# Patient Record
Sex: MALE | NOT HISPANIC OR LATINO | Employment: FULL TIME | URBAN - METROPOLITAN AREA
[De-identification: names, ages, dates, MRNs, and addresses within clinical notes are randomized per-mention and may not be internally consistent; named-entity substitution may affect disease eponyms.]

---

## 2023-09-14 PROBLEM — J45.20 MILD INTERMITTENT ASTHMA (HHS-HCC): Status: ACTIVE | Noted: 2021-12-06

## 2023-09-14 PROBLEM — F41.9 ANXIETY: Status: ACTIVE | Noted: 2022-12-19

## 2023-09-14 PROBLEM — M77.8 ELBOW TENDINITIS: Status: ACTIVE | Noted: 2023-09-14

## 2023-09-14 PROBLEM — J32.9 CHRONIC SINUSITIS: Status: ACTIVE | Noted: 2023-09-14

## 2023-09-14 PROBLEM — M54.12 CERVICAL RADICULOPATHY: Status: ACTIVE | Noted: 2023-09-14

## 2023-09-14 PROBLEM — G43.909 MIGRAINE HEADACHE: Status: ACTIVE | Noted: 2023-09-14

## 2023-09-14 PROBLEM — J30.9 ALLERGIC RHINITIS: Status: ACTIVE | Noted: 2023-09-14

## 2023-09-14 PROBLEM — E78.5 HYPERLIPIDEMIA: Status: ACTIVE | Noted: 2023-09-14

## 2023-09-14 PROBLEM — H44.23 MYOPIC DEGENERATION, BILATERAL: Status: ACTIVE | Noted: 2023-09-14

## 2023-09-14 PROBLEM — G25.81 RLS (RESTLESS LEGS SYNDROME): Status: ACTIVE | Noted: 2023-09-14

## 2023-09-14 PROBLEM — G47.33 OBSTRUCTIVE SLEEP APNEA: Status: ACTIVE | Noted: 2023-09-14

## 2023-09-14 PROBLEM — D70.9 NEUTROPENIA (CMS-HCC): Status: ACTIVE | Noted: 2022-01-11

## 2023-09-14 PROBLEM — Z86.69 HISTORY OF RETINAL DETACHMENT: Status: ACTIVE | Noted: 2023-09-14

## 2023-09-14 RX ORDER — ASCORBIC ACID 500 MG
1000 TABLET ORAL DAILY
COMMUNITY
Start: 2022-12-19

## 2023-09-14 RX ORDER — MONTELUKAST SODIUM 10 MG/1
10 TABLET ORAL
COMMUNITY
Start: 2020-12-28 | End: 2023-12-21 | Stop reason: ALTCHOICE

## 2023-09-14 RX ORDER — CETIRIZINE HYDROCHLORIDE, PSEUDOEPHEDRINE HYDROCHLORIDE 5; 120 MG/1; MG/1
1 TABLET, FILM COATED, EXTENDED RELEASE ORAL 2 TIMES DAILY PRN
COMMUNITY
Start: 2018-03-21 | End: 2023-12-21 | Stop reason: ALTCHOICE

## 2023-09-14 RX ORDER — ELETRIPTAN HYDROBROMIDE 40 MG/1
40 TABLET, FILM COATED ORAL ONCE AS NEEDED
COMMUNITY
Start: 2019-07-23

## 2023-09-14 RX ORDER — ACETYLCYSTEINE 600 MG
600 CAPSULE ORAL DAILY
COMMUNITY
Start: 2021-12-06 | End: 2024-04-05 | Stop reason: ALTCHOICE

## 2023-09-14 RX ORDER — MILK THISTLE 150 MG
1 CAPSULE ORAL
COMMUNITY
Start: 2021-12-06

## 2023-09-14 RX ORDER — CETIRIZINE HYDROCHLORIDE 10 MG/1
10 TABLET ORAL
COMMUNITY

## 2023-09-14 RX ORDER — LEVOFLOXACIN 500 MG/1
1 TABLET, FILM COATED ORAL DAILY
COMMUNITY
Start: 2020-03-12 | End: 2023-12-21 | Stop reason: ALTCHOICE

## 2023-09-14 RX ORDER — ZAFIRLUKAST 20 MG/1
20 TABLET, FILM COATED ORAL
COMMUNITY
Start: 2021-12-06

## 2023-09-14 RX ORDER — AZELASTINE HYDROCHLORIDE, FLUTICASONE PROPIONATE 137; 50 UG/1; UG/1
SPRAY, METERED NASAL
COMMUNITY
Start: 2021-12-06 | End: 2023-12-21 | Stop reason: ALTCHOICE

## 2023-09-14 RX ORDER — ALBUTEROL SULFATE 90 UG/1
2 AEROSOL, METERED RESPIRATORY (INHALATION) EVERY 4 HOURS PRN
COMMUNITY
Start: 2014-07-09

## 2023-10-17 ENCOUNTER — OFFICE VISIT (OUTPATIENT)
Dept: SLEEP MEDICINE | Facility: HOSPITAL | Age: 53
End: 2023-10-17
Payer: COMMERCIAL

## 2023-10-17 DIAGNOSIS — G47.33 OBSTRUCTIVE SLEEP APNEA: Primary | ICD-10-CM

## 2023-10-17 PROCEDURE — 99215 OFFICE O/P EST HI 40 MIN: CPT | Performed by: INTERNAL MEDICINE

## 2023-10-17 PROCEDURE — 99215 OFFICE O/P EST HI 40 MIN: CPT | Mod: 95 | Performed by: INTERNAL MEDICINE

## 2023-10-17 NOTE — PROGRESS NOTES
[facility]  Follow-up Visit Note    Virtual or Telephone Consent  An interactive audio and video telecommunication system which permits real time communications between the patient (at the originating site) and provider (at the distant site) was utilized to provide this telehealth service.   Verbal consent was requested and obtained from Holger Mckeon on this date, 10/17/23 for a telehealth visit.     CHIEF COMPLAINT     Chief Complaint   Patient presents with    Follow-up        HISTORY OF PRESENT ILLNESS     The patient's referring provider is: No ref. provider found    HISTORY OF PRESENT ILLNESS   Holger Mckeon is a 53 y.o. male with PMHx of HLD, migraine, chronic sinusitis who presents for follow-up with:      # OZZY  - 1/25/2022: SM (Last OV)--Plan: start AirSense-11 at 10-15 cm H2O. Advised positional therapy, avoid supine sleep.  - tried Re mask, but switched to FP due to recall on Re--states the Re created leak when he slept on sides. Now using Rachelle View   - reports waking up due to mask shifting, but is able to fall back to sleep easily      Sleep Schedule:    Weekdays / Work Days Weekends / Days Off   Bedtime 10:30 pm [x] Same   Sleep latency <5 minutes    Wake time 7:00 am    Naps? Yes - taking a nap around 8pm for 1-1.5 hours, 3x/week  Naps are not refreshing.       Nocturnal awakenings 1-2x/night  Last <5 minutes  Adjust mask/hose, reposition       PAP Adherence:  DURABLE MEDICAL EQUIPMENT COMPANY: MEDICAL SERVICE COMPANY  Machine: RESMED AIRSENSE 11   ISSUES WITH THERAPY: does report somewhat of a leak  PERCEIVED BENEFITS OF PAP: sleeping for a longer duration of time    Download review date: 10/17/23  PAP Setting  Machine : [employer]  Date Range: 9/16/23 - 10/15/23  PAP Mode: APAP  EPAPmin (cmH20): 10  EPAPmax (cmH20): 15  Modified pressure profile :  (EPR 3) PAP Useage  % days >= 4 hours/night: 87  PAP Pressure/AHI/Leak  Mean EPAP: 10.1  P90/P95th  EPAP: 10.8  Mean leak (lpm): 20.2  90th/95th percentile leak (lpm): 43.7  Residual AHI: 0.9  Central AHI: 0.2       Daytime Symptoms  Patient report some daytime symptoms including: reports taking naps on days when he wakes up @ 7am vs 8am   Patient denies daytime symptoms including: Denies: excessive daytime sleepiness          ALLERGIES AND MEDICATIONS     ALLERGIES  Allergies   Allergen Reactions    Ibuprofen Unknown       MEDICATIONS  Current Outpatient Medications   Medication Sig Dispense Refill    acetylcysteine (NAC) 600 mg capsule Take 1 capsule (600 mg) by mouth once daily.      albuterol 90 mcg/actuation inhaler Inhale 2 puffs every 4 hours if needed.      ascorbic acid (Vitamin C) 500 mg tablet Take 2 tablets (1,000 mg) by mouth once daily.      azelastine-fluticasone 137-50 mcg/spray spray,non-aerosol Administer into affected nostril(s).      B2/vits A,C,E/lut/zeaxanth/min (ICAPS ORAL) Take by mouth.      cetirizine (ZyrTEC) 10 mg tablet Take 1 tablet (10 mg) by mouth.      cetirizine-pseudoephedrine (ZyrTEC-D) 5-120 mg 12 hr tablet Take 1 tablet by mouth 2 times a day as needed.      cholecalciferol, vitamin D3, (VITAMIN D3 ORAL) daily, 0 Refill(s), Type: Maintenance      eletriptan (Relpax) 40 mg tablet Take 1 tablet (40 mg) by mouth 1 time if needed.      levoFLOXacin (Levaquin) 500 mg tablet Take 1 tablet (500 mg) by mouth once daily.      montelukast (Singulair) 10 mg tablet Take 1 tablet (10 mg) by mouth.      multivit-min/ferrous fumarate (MULTI VITAMIN ORAL) 0 Refill(s), Type: Maintenance      omega-3/dha/epa/fish oil (OMEGA-3 FISH OIL ORAL) Take 1,000 mg by mouth.      quercetin 500 mg capsule Take 1 capsule by mouth.      zafirlukast (Accolate) 20 mg tablet Take 1 tablet (20 mg) by mouth.      ZINC ACETATE ORAL Take 140 mg by mouth once daily.       No current facility-administered medications for this visit.         PAST MEDICAL HISTORY   Past Medical History:   Diagnosis Date     Hyperlipidemia, unspecified     Dyslipidemia    Migraine without aura, not intractable, without status migrainosus     Common migraine without aura    Other intervertebral disc degeneration, lumbar region     Lumbar degenerative disc disease        PAST SURGICAL HISTORY  History reviewed. No pertinent surgical history.     FAMILY HISTORY   Family History   Problem Relation Name Age of Onset    Uterine cancer Mother      Colonic polyp Mother      Diabetes Mother      Other (AAA) Father      Colonic polyp Father      Coronary artery disease Father      Other (CABG) Father          SOCIAL HISTORY  Social History     Socioeconomic History    Marital status:      Spouse name: Not on file    Number of children: Not on file    Years of education: Not on file    Highest education level: Not on file   Occupational History    Not on file   Tobacco Use    Smoking status: Unknown    Smokeless tobacco: Not on file   Substance and Sexual Activity    Alcohol use: Not on file    Drug use: Not on file    Sexual activity: Not on file   Other Topics Concern    Not on file   Social History Narrative    Not on file     Social Determinants of Health     Financial Resource Strain: Not on file   Food Insecurity: Not on file   Transportation Needs: Not on file   Physical Activity: Not on file   Stress: Not on file   Social Connections: Not on file   Intimate Partner Violence: Not on file   Housing Stability: Not on file          PHYSICAL EXAM     VITAL SIGNS: There were no vitals taken for this visit.     CURRENT WEIGHT:  There were no vitals filed for this visit.   BMI: There is no height or weight on file to calculate BMI.   PREVIOUS WEIGHTS:  Wt Readings from Last 3 Encounters:   12/19/22 106 kg (233 lb)   05/26/22 105 kg (232 lb)   12/06/21 99.8 kg (220 lb)       Physical Exam  Constitutional:       General: He is not in acute distress.     Appearance: Normal appearance. He is not ill-appearing, toxic-appearing or diaphoretic.  "  HENT:      Head: Normocephalic and atraumatic.      Right Ear: External ear normal.      Left Ear: External ear normal.   Pulmonary:      Effort: Pulmonary effort is normal.   Neurological:      Mental Status: He is alert and oriented to person, place, and time.   Psychiatric:         Mood and Affect: Mood normal.         Behavior: Behavior normal.           LABS     No results found for: \"IRON\", \"TRANSFERRIN\", \"IRONSAT\", \"TIBC\", \"FERRITIN\"      ASSESSMENT/PLAN     Holger Mckeon is a 53 y.o. male with PMHx of HLD, migraine, chronic sinusitis who presents for follow-up with:    Problem List Items Addressed This Visit          Sleep Problems    Obstructive sleep apnea - Primary    Overview     - currently on APAP 10-15cwp w/ max of 11.4cwp--reduce pressures --> 10-12cwp  - CR shows evidence of leak and pt also reporting sensation of leak w/ need to wake up during night to adjust mask  - will send for mask refitting session to try different interfaces to help minimize leak (has tried I-series in the past, did not like)--suggested: Air Touch FFM, F30i, DreamWear FFM  - he is worried due to recently taking naps around 8pm for 1-1.5 hours--discussed going to bed earlier with the hope that changing settings and reducing leak will allow him to sleep through the night and eliminate the need for evening naps  - RTC in 6 months for follow-up to monitor changes made today         Relevant Orders    Follow Up In Adult Sleep Medicine    Positive Airway Pressure (PAP) Therapy        Follow-Up: 6 months    F/U visit. Last seen on 1/25/2022. OZZY, on auto-CPAP 10-15 cm H2O. Tried DreamWear FFM and loves it. Has a history of allergy to pollens, dusts, and cats. Has a dog at home. Chronic sinus congestion has improved on meds. Has 3 machines now: AS11 as the primary one, DS2 as backup, and Z2 for traveling. Interested in INSPIRE but not a great candidate (2016 AHI 16.4, did well on CPAP). Got Bongo for traveling which did not work. " "Had mucosal dryness and pressure intolerance in 2021. Advised to try Biotene gel and Ayr nasal gel. DL today showed 97% use, 6.4 hours, AHI 0.9 and leaks 43.7 LPM on 10-15 cm H2O with an Rachelle View FFM. Advised mask refitting and that he try F30i FFM, Dreamwear FFM or AirTouch FFM. Lower pressure to 10-12 cm H2O. Refill supplies via [facility]. Discussed reclining bed frame. No recommendation for pillows. Short naps under 20-30 min. RTC 6 months. He verbalized understanding.     This is a telehealth encounter via Epic. An interactive audio and video telecommunication system which permits real time communications between the patient (at the originating site) and provider (at a distant site) was utilized to provide this telehealth service. Attestation: This telehealth visit was conducted in concordance with CDC's recommendation that during the SARS CoV2 outbreak healthcare facilities should \"provide non-urgent patient care by telephone\" especially in patients at increased risk for an adverse outcome when exposed to coronavirus infections <https://www.cdc.gov/coronavirus/2019-ncov/healthcare-facilities/steps-to-prepare.html>.      "

## 2023-10-17 NOTE — PATIENT INSTRUCTIONS
[facility] Sleep Medicine  [address]  [facility] 6TH FLOOR  [facility] [address]  Dept: [phone]       NAME: Holger Mckeon   DATE: 10/17/23     Your Sleep Provider Today: Phillip Shah MD  Your Primary Care Physician: Kwasi Mendze, DO   Your Referring Provider: No ref. provider found    DIAGNOSIS:   1. Obstructive sleep apnea  Follow Up In Adult Sleep Medicine    Positive Airway Pressure (PAP) Therapy    CANCELED: Positive Airway Pressure (PAP) Therapy    CANCELED: Follow Up In Adult Sleep Medicine        - APAP 10-12cwp  - new face-mask fitting  - renewed order for PAP supplies     Thank you for coming to the Sleep Medicine Clinic today! Your sleep medicine provider today was: Phillip Shah MD Below is a summary of your treatment plan, other important information, and our contact numbers:      TREATMENT PLAN     - Follow-up in 6 months.  - If not already done, sign up for 'My Chart' and send prescription requests or messages through this    Instructions - Common OZZY Recs: - For your sleep apnea, continue to use your PAP every night and use it whenever you are sleeping.   - Avoid alcohol or sedatives several hours prior to sleeping.   - Get additional supplies for your PAP (e.g., mask, hose, filters) every 3 months or as your insurance allows from your [employer] company. Replacement cushions for your PAP mask can be requested monthly if airseals are an issue.  - Remember to clean your mask, tubings, and water chamber regularly as instructed.  - Avoid driving or operating heavy machinery when drowsy. A person driving while sleepy is five (5) times more likely to have an accident. If you feel sleepy, pull over and take a short power nap (sleep for less than 30 minutes). Otherwise, ask somebody to drive you.        IMPORTANT INFORMATION     Call 911 for medical emergencies.  Our offices are generally open from Monday-Friday, 9 am - 5 pm.  If you need to get in touch with me, you  may either call me and my team(number is below) or you can use [employer].  If a referral for a test, for CPAP, or for another specialist was made, and you have not heard about scheduling this within a week, please call scheduling at [phone]).  If you are unable to make your appointment for clinic or an overnight study, kindly call the office at least 48 hours in advance to cancel and reschedule.  If you are on CPAP, please bring your device's card or the device to each clinic appointment.   There are no supporting services by either the sleep doctors or their staff on weekends and Holidays, or after 5 PM on weekdays.   If you have been asked to come to a sleep study, make sure you bring toiletries, a comfy pillow, and any nighttime medications that you may regularly take. Also be sure to eat dinner before you arrive. We generally do not provide meals.    PRESCRIPTIONS     We require 7 days advanced notice for prescription refills. If we do not receive the request in this time, we cannot guarantee that your medication will be refilled in time.    IMPORTANT PHONE NUMBERS     Sleep Medicine Clinic Fax: [phone]  Appointments (for Pediatric Sleep Clinic): [phone] - option 1  Appointments (for Adult Sleep Clinic): [phone] - option 2  Appointments (For Sleep Studies): [phone] - option 3  Behavioral Sleep Medicine: [phone]  Sleep Surgery: [phone]  ENT (Otolaryngology): [phone]  Headache Clinic (Neurology): [phone]  Neurology: [phone]  Psychiatry: [phone]  Pulmonary Function Testing (PFT) Center: [phone]  Pulmonary Medicine: [phone]  [employer] (DME): [phone]  [employer] (DME): [phone]  [facility] ([facility] [phone]      OUR ADULT SLEEP MEDICINE TEAM   Please do not hesitate to call the office or sleep nurse with any questions between appointments:    Adult Sleep Nurses (Laurita Lobato RN and  Nat Cheung RN):  For clinical questions and refilling prescriptions: [phone]  Email sleep diaries and other documents at: [email or web address]    Adult Sleep Medicine Secretaries:  Karlie Martines (For Pablo/Mendez/Krise/Strohl/Tony/Small):   P: [phone]  F: [phone]  Charis Garcia (For [place]/Marekggbharat): P: [phone]  Fax: [phone]  Nida Richardson (For Jurcevic/Blank): P: [phone]  F: [phone]  Sabine Camp (For [place]): P: [phone]  F: [phone]  Gloria Hernandez (For Oma/Robina/Zakhary): P: [phone]  F: [phone]  Brooke Hill (For Willian/Sheehan): P: [phone]  F: [phone]     Adult Sleep Medicine Advanced Practice Providers:  Harvey Gonzales ([place], [place])  Estela Quarles ([facility])  Denise Brooks CNP ([place], [place], [place])  Emilee Caldera CNP ([place], [place], [place])  Eboni Cormier ([place], [place], [place])  Alex Sheehan CNP ([facility])      OUR SLEEP TESTING LOCATIONS     Our team will contact you to schedule your sleep study, however, you can contact us as follow:  Main Phone Line (scheduling only): [phone]), option 3  Adult and Pediatric Locations  [facility] (6 years and older): [facility] - 4th floor ([address] [place] [address]) After hours line: [phone]  [facility] (Main campus: All ages): [facility], 6th floor. After hours line: [phone]  [facility] (5 years and older; younger considered on case-by-case basis): [address]; [address], [address]. Scheduling  After hours line: [phone]   [place] (6 years and older): [address]; [address]; [address]   [place] (6 years and older): [address] [facility], [address]  After hours line: [phone]   [place] (13 years and older) in [place]: [address], 2nd floor  After hours line: [phone]  Critical access hospital  "(13 year and older): [address]  After hours line: [phone]     Adult Only Locations:   [place] (18 years and older): [address] [place] [address], 2nd floor   [place] (18 years and older): [address] [facility]; 4th floor  After hours line: [phone]   [place] (18 years and older) at [place]: [id number] [place] [address]  After hours line: [phone]        CONTACTING YOUR SLEEP MEDICINE PROVIDER     Send a message directly to your provider through \"My Chart\", which is the email service through your  Records Account: [email or web address]   Call [phone] and leave a message. One of the administrative assistants will forward the message to your sleep medicine provider through \"My Chart\" and/or email.     Your sleep medicine provider for this visit was: Phillip Shah MD   "

## 2023-12-08 ENCOUNTER — TELEPHONE (OUTPATIENT)
Dept: PRIMARY CARE | Facility: CLINIC | Age: 53
End: 2023-12-08
Payer: COMMERCIAL

## 2023-12-08 DIAGNOSIS — Z00.00 ANNUAL PHYSICAL EXAM: ICD-10-CM

## 2023-12-08 DIAGNOSIS — E78.5 HYPERLIPIDEMIA, UNSPECIFIED HYPERLIPIDEMIA TYPE: ICD-10-CM

## 2023-12-15 ENCOUNTER — LAB (OUTPATIENT)
Dept: LAB | Facility: LAB | Age: 53
End: 2023-12-15
Payer: COMMERCIAL

## 2023-12-15 DIAGNOSIS — E78.5 HYPERLIPIDEMIA, UNSPECIFIED HYPERLIPIDEMIA TYPE: ICD-10-CM

## 2023-12-15 DIAGNOSIS — Z12.5 SCREENING FOR PROSTATE CANCER: ICD-10-CM

## 2023-12-15 DIAGNOSIS — Z00.00 ANNUAL PHYSICAL EXAM: ICD-10-CM

## 2023-12-15 LAB
ALBUMIN SERPL BCP-MCNC: 4.4 G/DL (ref 3.4–5)
ALP SERPL-CCNC: 52 U/L (ref 33–120)
ALT SERPL W P-5'-P-CCNC: 28 U/L (ref 10–52)
ANION GAP SERPL CALC-SCNC: 16 MMOL/L (ref 10–20)
APPEARANCE UR: CLEAR
AST SERPL W P-5'-P-CCNC: 23 U/L (ref 9–39)
BASOPHILS # BLD AUTO: 0.08 X10*3/UL (ref 0–0.1)
BASOPHILS NFR BLD AUTO: 1.9 %
BILIRUB SERPL-MCNC: 1.2 MG/DL (ref 0–1.2)
BILIRUB UR STRIP.AUTO-MCNC: NEGATIVE MG/DL
BUN SERPL-MCNC: 16 MG/DL (ref 6–23)
CALCIUM SERPL-MCNC: 9.5 MG/DL (ref 8.6–10.3)
CHLORIDE SERPL-SCNC: 101 MMOL/L (ref 98–107)
CHOLEST SERPL-MCNC: 237 MG/DL (ref 0–199)
CHOLESTEROL/HDL RATIO: 5.8
CO2 SERPL-SCNC: 29 MMOL/L (ref 21–32)
COLOR UR: YELLOW
CREAT SERPL-MCNC: 1.03 MG/DL (ref 0.5–1.3)
EOSINOPHIL # BLD AUTO: 0.18 X10*3/UL (ref 0–0.7)
EOSINOPHIL NFR BLD AUTO: 4.3 %
ERYTHROCYTE [DISTWIDTH] IN BLOOD BY AUTOMATED COUNT: 12.1 % (ref 11.5–14.5)
GFR SERPL CREATININE-BSD FRML MDRD: 87 ML/MIN/1.73M*2
GLUCOSE SERPL-MCNC: 78 MG/DL (ref 74–99)
GLUCOSE UR STRIP.AUTO-MCNC: NEGATIVE MG/DL
HCT VFR BLD AUTO: 43.4 % (ref 41–52)
HDLC SERPL-MCNC: 40.6 MG/DL
HGB BLD-MCNC: 14.6 G/DL (ref 13.5–17.5)
IMM GRANULOCYTES # BLD AUTO: 0.01 X10*3/UL (ref 0–0.7)
IMM GRANULOCYTES NFR BLD AUTO: 0.2 % (ref 0–0.9)
KETONES UR STRIP.AUTO-MCNC: NEGATIVE MG/DL
LDLC SERPL CALC-MCNC: 183 MG/DL
LEUKOCYTE ESTERASE UR QL STRIP.AUTO: NEGATIVE
LYMPHOCYTES # BLD AUTO: 1.49 X10*3/UL (ref 1.2–4.8)
LYMPHOCYTES NFR BLD AUTO: 35.9 %
MCH RBC QN AUTO: 32.2 PG (ref 26–34)
MCHC RBC AUTO-ENTMCNC: 33.6 G/DL (ref 32–36)
MCV RBC AUTO: 96 FL (ref 80–100)
MONOCYTES # BLD AUTO: 0.47 X10*3/UL (ref 0.1–1)
MONOCYTES NFR BLD AUTO: 11.3 %
NEUTROPHILS # BLD AUTO: 1.92 X10*3/UL (ref 1.2–7.7)
NEUTROPHILS NFR BLD AUTO: 46.4 %
NITRITE UR QL STRIP.AUTO: NEGATIVE
NON HDL CHOLESTEROL: 196 MG/DL (ref 0–149)
NRBC BLD-RTO: 0 /100 WBCS (ref 0–0)
PH UR STRIP.AUTO: 5 [PH]
PLATELET # BLD AUTO: 199 X10*3/UL (ref 150–450)
POTASSIUM SERPL-SCNC: 4.2 MMOL/L (ref 3.5–5.3)
PROT SERPL-MCNC: 7.2 G/DL (ref 6.4–8.2)
PROT UR STRIP.AUTO-MCNC: NEGATIVE MG/DL
RBC # BLD AUTO: 4.54 X10*6/UL (ref 4.5–5.9)
RBC # UR STRIP.AUTO: NEGATIVE /UL
SODIUM SERPL-SCNC: 142 MMOL/L (ref 136–145)
SP GR UR STRIP.AUTO: 1.01
TRIGL SERPL-MCNC: 66 MG/DL (ref 0–149)
UROBILINOGEN UR STRIP.AUTO-MCNC: <2 MG/DL
VLDL: 13 MG/DL (ref 0–40)
WBC # BLD AUTO: 4.2 X10*3/UL (ref 4.4–11.3)

## 2023-12-15 PROCEDURE — 80053 COMPREHEN METABOLIC PANEL: CPT

## 2023-12-15 PROCEDURE — 80061 LIPID PANEL: CPT

## 2023-12-15 PROCEDURE — 84153 ASSAY OF PSA TOTAL: CPT

## 2023-12-15 PROCEDURE — 85025 COMPLETE CBC W/AUTO DIFF WBC: CPT

## 2023-12-15 PROCEDURE — 81003 URINALYSIS AUTO W/O SCOPE: CPT

## 2023-12-15 PROCEDURE — 36415 COLL VENOUS BLD VENIPUNCTURE: CPT

## 2023-12-18 ENCOUNTER — TELEPHONE (OUTPATIENT)
Dept: PRIMARY CARE | Facility: CLINIC | Age: 53
End: 2023-12-18
Payer: COMMERCIAL

## 2023-12-18 DIAGNOSIS — Z12.5 SCREENING FOR PROSTATE CANCER: ICD-10-CM

## 2023-12-18 NOTE — TELEPHONE ENCOUNTER
Patient had his labs done last week and he noticed everything came through his Mychart.  Patient noticed there was no PSA.   Asking if this should have been done and if it was not ordered, does he need to get this done.     Please call patient back.

## 2023-12-19 LAB — PSA SERPL-MCNC: 0.65 NG/ML

## 2023-12-20 PROBLEM — M77.8 ELBOW TENDINITIS: Status: RESOLVED | Noted: 2023-09-14 | Resolved: 2023-12-20

## 2023-12-21 ENCOUNTER — OFFICE VISIT (OUTPATIENT)
Dept: PRIMARY CARE | Facility: CLINIC | Age: 53
End: 2023-12-21
Payer: COMMERCIAL

## 2023-12-21 ENCOUNTER — TELEPHONE (OUTPATIENT)
Dept: PRIMARY CARE | Facility: CLINIC | Age: 53
End: 2023-12-21

## 2023-12-21 VITALS
OXYGEN SATURATION: 97 % | HEART RATE: 88 BPM | DIASTOLIC BLOOD PRESSURE: 62 MMHG | WEIGHT: 244 LBS | HEIGHT: 75 IN | BODY MASS INDEX: 30.34 KG/M2 | SYSTOLIC BLOOD PRESSURE: 118 MMHG

## 2023-12-21 DIAGNOSIS — E78.2 MIXED HYPERLIPIDEMIA: ICD-10-CM

## 2023-12-21 DIAGNOSIS — J30.9 ALLERGIC RHINITIS, UNSPECIFIED SEASONALITY, UNSPECIFIED TRIGGER: ICD-10-CM

## 2023-12-21 DIAGNOSIS — G43.109 MIGRAINE WITH AURA AND WITHOUT STATUS MIGRAINOSUS, NOT INTRACTABLE: ICD-10-CM

## 2023-12-21 DIAGNOSIS — Z00.00 GENERAL MEDICAL EXAM: Primary | ICD-10-CM

## 2023-12-21 DIAGNOSIS — J45.20 MILD INTERMITTENT ASTHMA WITHOUT COMPLICATION (HHS-HCC): ICD-10-CM

## 2023-12-21 PROCEDURE — 1036F TOBACCO NON-USER: CPT | Performed by: FAMILY MEDICINE

## 2023-12-21 PROCEDURE — 99396 PREV VISIT EST AGE 40-64: CPT | Performed by: FAMILY MEDICINE

## 2023-12-21 PROCEDURE — 93000 ELECTROCARDIOGRAM COMPLETE: CPT | Performed by: FAMILY MEDICINE

## 2023-12-21 RX ORDER — PREDNISONE 20 MG/1
40 TABLET ORAL DAILY
Qty: 10 TABLET | Refills: 0 | Status: SHIPPED | OUTPATIENT
Start: 2023-12-21 | End: 2023-12-26

## 2023-12-21 ASSESSMENT — ENCOUNTER SYMPTOMS
JOINT SWELLING: 0
NERVOUS/ANXIOUS: 0
SORE THROAT: 0
BLOOD IN STOOL: 0
NUMBNESS: 0
FEVER: 0
COUGH: 0
TROUBLE SWALLOWING: 0
WEAKNESS: 0
VOMITING: 0
SHORTNESS OF BREATH: 0
DYSPHORIC MOOD: 0
ABDOMINAL PAIN: 0
HEMATURIA: 0
DIFFICULTY URINATING: 0
UNEXPECTED WEIGHT CHANGE: 0

## 2023-12-21 ASSESSMENT — PAIN SCALES - GENERAL: PAINLEVEL: 0-NO PAIN

## 2023-12-21 ASSESSMENT — PATIENT HEALTH QUESTIONNAIRE - PHQ9
2. FEELING DOWN, DEPRESSED OR HOPELESS: NOT AT ALL
1. LITTLE INTEREST OR PLEASURE IN DOING THINGS: NOT AT ALL
SUM OF ALL RESPONSES TO PHQ9 QUESTIONS 1 AND 2: 0

## 2023-12-21 NOTE — PROGRESS NOTES
"Subjective   Patient ID: Holger Mckeon is a 53 y.o. male who presents for Annual Exam.    HPI   Holger  is seen for for his comprehensive preventive exam. PMH, PSH, family history and social history were reviewed and updated.  Hypercholesterolemia - Takes Omega 3, CT calcium sort in 2019 was negative for any plaque, nuclear stress test done in 2011 was negative  myopic degeneration -  sees ophthalmologist  reactive airway disease -  well controlled without any flares over the past year  seasonal allergies -  stable  OZZY - sees Dr. Estrada  Decreased hearing - notices it more in meeting    Review of Systems   Constitutional:  Negative for fever and unexpected weight change.   HENT:  Negative for congestion, ear pain, nosebleeds, postnasal drip, sore throat and trouble swallowing.    Eyes:  Negative for visual disturbance.   Respiratory:  Negative for cough and shortness of breath.    Cardiovascular:  Negative for chest pain and leg swelling.   Gastrointestinal:  Negative for abdominal pain, blood in stool and vomiting.   Genitourinary:  Negative for difficulty urinating and hematuria.   Musculoskeletal:  Negative for joint swelling.   Neurological:  Negative for weakness and numbness.   Psychiatric/Behavioral:  Negative for dysphoric mood. The patient is not nervous/anxious.        Objective   /62   Pulse 88   Ht 1.905 m (6' 3\")   Wt 111 kg (244 lb)   SpO2 97%   BMI 30.50 kg/m²     Physical Exam  Vitals and nursing note reviewed.   Constitutional:       Appearance: Normal appearance.   HENT:      Head: Normocephalic and atraumatic.      Nose: Nose normal.      Mouth/Throat:      Mouth: Mucous membranes are moist.   Eyes:      Extraocular Movements: Extraocular movements intact.      Pupils: Pupils are equal, round, and reactive to light.   Neck:      Vascular: No carotid bruit.   Cardiovascular:      Rate and Rhythm: Normal rate and regular rhythm.   Pulmonary:      Breath sounds: Normal breath sounds. "   Abdominal:      General: Abdomen is flat. Bowel sounds are normal.      Palpations: Abdomen is soft.      Tenderness: There is no abdominal tenderness.   Musculoskeletal:         General: Normal range of motion.      Cervical back: Normal range of motion.   Skin:     General: Skin is warm.      Findings: No rash.   Neurological:      General: No focal deficit present.      Mental Status: He is alert.   Psychiatric:         Mood and Affect: Mood normal.       Assessment/Plan   Problem List Items Addressed This Visit             ICD-10-CM    Hyperlipidemia E78.5     Uncontrolled.  LDL has risen from 163 to 183.  Work on diet and exercise for next 6 months and recheck given that he has no other risks       Mild intermittent asthma J45.20     Controlled.  Avoid triggers.         Migraine headache G43.909     Controlled.  Continue Eletriptan when needed          Other Visit Diagnoses         Codes    General medical exam    -  Primary  Preventative measures discussed Z00.00    Relevant Orders    ECG 12 Lead (Completed)     Follow up in 6 months

## 2024-03-01 ENCOUNTER — TELEPHONE (OUTPATIENT)
Dept: PRIMARY CARE | Facility: CLINIC | Age: 54
End: 2024-03-01
Payer: COMMERCIAL

## 2024-03-01 DIAGNOSIS — E78.2 MIXED HYPERLIPIDEMIA: ICD-10-CM

## 2024-04-05 ENCOUNTER — OFFICE VISIT (OUTPATIENT)
Dept: PRIMARY CARE | Facility: CLINIC | Age: 54
End: 2024-04-05
Payer: COMMERCIAL

## 2024-04-05 VITALS
WEIGHT: 235 LBS | DIASTOLIC BLOOD PRESSURE: 82 MMHG | OXYGEN SATURATION: 97 % | HEART RATE: 68 BPM | BODY MASS INDEX: 29.37 KG/M2 | SYSTOLIC BLOOD PRESSURE: 128 MMHG

## 2024-04-05 DIAGNOSIS — M77.8 FOREARM TENDONITIS: Primary | ICD-10-CM

## 2024-04-05 PROCEDURE — 99213 OFFICE O/P EST LOW 20 MIN: CPT

## 2024-04-05 RX ORDER — ASCORBATE CALCIUM/BIOFLAVONOID 500-250 MG
TABLET ORAL
COMMUNITY

## 2024-04-05 RX ORDER — FLUTICASONE PROPIONATE 50 MCG
1 SPRAY, SUSPENSION (ML) NASAL DAILY
COMMUNITY

## 2024-04-05 RX ORDER — PREDNISONE 20 MG/1
40 TABLET ORAL DAILY
Qty: 10 TABLET | Refills: 0 | Status: SHIPPED | OUTPATIENT
Start: 2024-04-05 | End: 2024-04-10

## 2024-04-05 ASSESSMENT — PATIENT HEALTH QUESTIONNAIRE - PHQ9
SUM OF ALL RESPONSES TO PHQ9 QUESTIONS 1 AND 2: 0
1. LITTLE INTEREST OR PLEASURE IN DOING THINGS: NOT AT ALL
2. FEELING DOWN, DEPRESSED OR HOPELESS: NOT AT ALL

## 2024-04-05 ASSESSMENT — PAIN SCALES - GENERAL: PAINLEVEL: 4

## 2024-04-05 NOTE — PROGRESS NOTES
Subjective   Patient ID: Holger Mckeon is a 53 y.o. male who presents for Elbow Pain (Left elbow pain/swelling and intermittent tingling x 4 weeks/No injury or loss of strength /Believes it's from working out /Would like to discuss an anti-inflammatory ).    HPI   Holger is seen for left forearm pain. Pain worse with pronation and supination, tenderness to medial and lateral epicondyles. Patient reports increase in weight lifting, recalls doing kettle bell swings with heavy weight, focuses on upper body. Has taken Tylenol, ice, massage with mild relief. Rests for a few days and it improves, then he works out again and it comes back. Denies numbness, tingling, weakness, radiation of LUE. Patient has allergy to Ibuprofen, unsure if this is accurate but has upcoming appointment with allergy to discuss this.     Review of Systems  All other systems have been reviewed and are negative except as noted in the HPI.     Objective   /82 (BP Location: Left arm)   Pulse 68   Wt 107 kg (235 lb)   SpO2 97%   BMI 29.37 kg/m²     Physical Exam  Vitals and nursing note reviewed.   Constitutional:       General: He is not in acute distress.  Eyes:      Extraocular Movements: Extraocular movements intact.      Conjunctiva/sclera: Conjunctivae normal.   Cardiovascular:      Rate and Rhythm: Normal rate.   Pulmonary:      Effort: Pulmonary effort is normal.   Musculoskeletal:         General: Normal range of motion.      Right shoulder: Normal.      Left shoulder: Normal.      Right upper arm: Normal.      Left upper arm: Normal.      Right elbow: Normal.      Left elbow: No swelling or effusion. Normal range of motion. Tenderness present in medial epicondyle and lateral epicondyle.      Right forearm: Normal.      Left forearm: Tenderness present. No swelling, edema or bony tenderness.      Right wrist: Normal.      Left wrist: Normal.      Right hand: Normal.      Left hand: Normal.      Cervical back: Neck supple.   Skin:      General: Skin is warm.   Neurological:      General: No focal deficit present.      Mental Status: He is alert.   Psychiatric:         Mood and Affect: Mood normal.         Assessment/Plan   Problem List Items Addressed This Visit    None  Visit Diagnoses         Codes    Forearm tendonitis    -  Primary  Acute.  Prednisone as directed. Risks and benefits of medication discussed and prescribed.   OTC Tylenol as directed. Rest, ice/heat, compression. Advance activity as tolerated.   Follow up if symptoms do not improve within 7-10 days, discussed referral to PT at that time.  M77.8

## 2024-04-09 ENCOUNTER — TELEPHONE (OUTPATIENT)
Dept: PRIMARY CARE | Facility: CLINIC | Age: 54
End: 2024-04-09
Payer: COMMERCIAL

## 2024-04-09 NOTE — TELEPHONE ENCOUNTER
Pt started the prednisone yesterday and today he has ha, no fever but it is is up  from his normal ,  face is hot, red. Please   advise  if this could be from the meds. [phone]

## 2024-06-21 ENCOUNTER — APPOINTMENT (OUTPATIENT)
Dept: PRIMARY CARE | Facility: CLINIC | Age: 54
End: 2024-06-21
Payer: COMMERCIAL

## 2024-06-22 ENCOUNTER — LAB (OUTPATIENT)
Dept: LAB | Facility: LAB | Age: 54
End: 2024-06-22
Payer: COMMERCIAL

## 2024-06-22 DIAGNOSIS — E78.2 MIXED HYPERLIPIDEMIA: ICD-10-CM

## 2024-06-22 LAB
ALBUMIN SERPL BCP-MCNC: 4.4 G/DL (ref 3.4–5)
ALP SERPL-CCNC: 50 U/L (ref 33–120)
ALT SERPL W P-5'-P-CCNC: 21 U/L (ref 10–52)
ANION GAP SERPL CALC-SCNC: 13 MMOL/L (ref 10–20)
AST SERPL W P-5'-P-CCNC: 21 U/L (ref 9–39)
BILIRUB SERPL-MCNC: 1.4 MG/DL (ref 0–1.2)
BUN SERPL-MCNC: 21 MG/DL (ref 6–23)
CALCIUM SERPL-MCNC: 9.5 MG/DL (ref 8.6–10.3)
CHLORIDE SERPL-SCNC: 102 MMOL/L (ref 98–107)
CHOLEST SERPL-MCNC: 226 MG/DL (ref 0–199)
CHOLESTEROL/HDL RATIO: 4.7
CO2 SERPL-SCNC: 27 MMOL/L (ref 21–32)
CREAT SERPL-MCNC: 0.98 MG/DL (ref 0.5–1.3)
EGFRCR SERPLBLD CKD-EPI 2021: >90 ML/MIN/1.73M*2
GLUCOSE SERPL-MCNC: 86 MG/DL (ref 74–99)
HDLC SERPL-MCNC: 47.7 MG/DL
LDLC SERPL CALC-MCNC: 163 MG/DL
NON HDL CHOLESTEROL: 178 MG/DL (ref 0–149)
POTASSIUM SERPL-SCNC: 4 MMOL/L (ref 3.5–5.3)
PROT SERPL-MCNC: 7.2 G/DL (ref 6.4–8.2)
SODIUM SERPL-SCNC: 138 MMOL/L (ref 136–145)
TRIGL SERPL-MCNC: 76 MG/DL (ref 0–149)
VLDL: 15 MG/DL (ref 0–40)

## 2024-06-22 PROCEDURE — 36415 COLL VENOUS BLD VENIPUNCTURE: CPT

## 2024-06-22 PROCEDURE — 80061 LIPID PANEL: CPT

## 2024-06-22 PROCEDURE — 80053 COMPREHEN METABOLIC PANEL: CPT

## 2024-06-26 ENCOUNTER — APPOINTMENT (OUTPATIENT)
Dept: PRIMARY CARE | Facility: CLINIC | Age: 54
End: 2024-06-26
Payer: COMMERCIAL

## 2024-06-26 ENCOUNTER — TELEPHONE (OUTPATIENT)
Dept: PRIMARY CARE | Facility: CLINIC | Age: 54
End: 2024-06-26

## 2024-06-26 VITALS
BODY MASS INDEX: 30 KG/M2 | HEART RATE: 80 BPM | SYSTOLIC BLOOD PRESSURE: 124 MMHG | OXYGEN SATURATION: 98 % | WEIGHT: 240 LBS | DIASTOLIC BLOOD PRESSURE: 72 MMHG

## 2024-06-26 DIAGNOSIS — Z11.59 NEED FOR HEPATITIS C SCREENING TEST: ICD-10-CM

## 2024-06-26 DIAGNOSIS — E78.2 MIXED HYPERLIPIDEMIA: Primary | ICD-10-CM

## 2024-06-26 DIAGNOSIS — E78.2 MIXED HYPERLIPIDEMIA: ICD-10-CM

## 2024-06-26 DIAGNOSIS — M77.12 LEFT LATERAL EPICONDYLITIS: ICD-10-CM

## 2024-06-26 DIAGNOSIS — Z11.4 ENCOUNTER FOR SCREENING FOR HIV: ICD-10-CM

## 2024-06-26 DIAGNOSIS — Z00.00 ROUTINE MEDICAL EXAM: ICD-10-CM

## 2024-06-26 PROBLEM — E78.5 DYSLIPIDEMIA: Status: ACTIVE | Noted: 2022-01-11

## 2024-06-26 PROCEDURE — 99214 OFFICE O/P EST MOD 30 MIN: CPT | Performed by: FAMILY MEDICINE

## 2024-06-26 RX ORDER — TIZANIDINE 4 MG/1
4 TABLET ORAL EVERY 8 HOURS PRN
Qty: 30 TABLET | Refills: 0 | Status: SHIPPED | OUTPATIENT
Start: 2024-06-26 | End: 2024-07-06

## 2024-06-26 ASSESSMENT — PAIN SCALES - GENERAL: PAINLEVEL: 0-NO PAIN

## 2024-06-26 NOTE — PROGRESS NOTES
Subjective   Patient ID: Holger Mckeon is a 54 y.o. male who presents for Hyperlipidemia (Left elbow pain, seen TRP previous completed steroid no improvement. ).    HPI   Holger presents for follow-up on his hypercholesterolemia.  He has been monitoring his diet.  Taking flaxseed oil.  He is also seen complaining of persistent left elbow pain.  He was seen at the beginning of April.  He had increased his weightlifting using kettle bell swings and noticed pain in both the medial and lateral epicondyle regions.  Treated for tendinitis with prednisone.    Review of Systems  All other systems have been reviewed and are negative except as noted in the HPI.       Objective   /72   Pulse 80   Wt 109 kg (240 lb)   SpO2 98%   BMI 30.00 kg/m²     Physical Exam  Vitals and nursing note reviewed.   Constitutional:       Appearance: Normal appearance.   Eyes:      Extraocular Movements: Extraocular movements intact.      Conjunctiva/sclera: Conjunctivae normal.      Pupils: Pupils are equal, round, and reactive to light.   Cardiovascular:      Rate and Rhythm: Normal rate and regular rhythm.      Heart sounds: No murmur heard.  Pulmonary:      Effort: Pulmonary effort is normal.      Breath sounds: Normal breath sounds.   Neurological:      General: No focal deficit present.      Mental Status: He is alert.   Psychiatric:         Mood and Affect: Mood normal.       Lab Results   Component Value Date    CHOL 226 (H) 06/22/2024    CHOL 237 (H) 12/15/2023    CHOL 226 (H) 12/17/2022     Lab Results   Component Value Date    HDL 47.7 06/22/2024    HDL 40.6 12/15/2023    HDL 50 12/17/2022     Lab Results   Component Value Date    LDLCALC 163 (H) 06/22/2024    LDLCALC 183 (H) 12/15/2023    LDLCALC 163 (H) 12/17/2022     Lab Results   Component Value Date    TRIG 76 06/22/2024    TRIG 66 12/15/2023    TRIG 67 12/17/2022       Assessment/Plan   Problem List Items Addressed This Visit             ICD-10-CM    Hyperlipidemia -  Primary E78.5     Improved with diet and exercise regimen.  LDL down 20 points.  Will continue with diet and exercise          Other Visit Diagnoses         Codes    Left lateral epicondylitis      Persistent despite conservative treatment.  Referral to Ortho for possible injection. M77.12    Relevant Orders    Referral to Orthopaedic Surgery     Follow-up in 6 months for annual wellness

## 2024-07-07 NOTE — ASSESSMENT & PLAN NOTE
Improved with diet and exercise regimen.  LDL down 20 points.  Will continue with diet and exercise

## 2024-07-31 ENCOUNTER — TELEMEDICINE (OUTPATIENT)
Dept: PRIMARY CARE | Facility: CLINIC | Age: 54
End: 2024-07-31
Payer: COMMERCIAL

## 2024-07-31 DIAGNOSIS — U07.1 COVID: Primary | ICD-10-CM

## 2024-07-31 PROCEDURE — 99213 OFFICE O/P EST LOW 20 MIN: CPT

## 2024-07-31 PROCEDURE — 1036F TOBACCO NON-USER: CPT

## 2024-07-31 ASSESSMENT — ENCOUNTER SYMPTOMS
HEADACHES: 1
CHILLS: 1
FEVER: 1
MYALGIAS: 0
NAUSEA: 0
VOMITING: 0
SHORTNESS OF BREATH: 0
SINUS PRESSURE: 1
LIGHT-HEADEDNESS: 0
WHEEZING: 0
DIZZINESS: 0

## 2024-07-31 ASSESSMENT — PATIENT HEALTH QUESTIONNAIRE - PHQ9
SUM OF ALL RESPONSES TO PHQ9 QUESTIONS 1 AND 2: 0
2. FEELING DOWN, DEPRESSED OR HOPELESS: NOT AT ALL
1. LITTLE INTEREST OR PLEASURE IN DOING THINGS: NOT AT ALL

## 2024-07-31 ASSESSMENT — COLUMBIA-SUICIDE SEVERITY RATING SCALE - C-SSRS: 1. IN THE PAST MONTH, HAVE YOU WISHED YOU WERE DEAD OR WISHED YOU COULD GO TO SLEEP AND NOT WAKE UP?: NO

## 2024-07-31 NOTE — PROGRESS NOTES
Subjective   Patient ID: Holger Mckeon is a 54 y.o. male who presents for covid.    With patient's permission, this is a Telemedicine visit with video and audio. Provider located at office address. Patient located at their home address. All issues as documented below were discussed and addressed but limited physical exam was performed. If it was felt that the patient should be evaluated via face-to-face office appointment(s) they were directed to appropriate location.     URI   This is a new problem. The current episode started in the past 7 days. Associated symptoms include congestion and headaches. Pertinent negatives include no chest pain, nausea, vomiting or wheezing. He has tried increased fluids, antihistamine and decongestant for the symptoms. The treatment provided mild relief.   Tested +COVID today, symptoms started 7/29/24.     Review of Systems   Constitutional:  Positive for chills and fever.   HENT:  Positive for congestion, postnasal drip and sinus pressure.    Respiratory:  Negative for shortness of breath and wheezing.    Cardiovascular:  Negative for chest pain.   Gastrointestinal:  Negative for nausea and vomiting.   Musculoskeletal:  Negative for myalgias.   Neurological:  Positive for headaches. Negative for dizziness and light-headedness.     Objective   There were no vitals taken for this visit.    Physical Exam  Not performed due to limitations virtual telemedicine encounter.     Assessment/Plan   Problem List Items Addressed This Visit    None  Visit Diagnoses         Codes    COVID    -  Primary  Acute. Meets criteria for Paxlovid therapy, hx asthma and BMI >30. GFR 90. Risks and benefits of medication discussed and prescribed.   OTC Tylenol as directed for aches. Continue OTC antihistamine and nasal corticosteroid. Increased fluids, rest, humidifier.   Discussed CDC isolation guidelines.   Follow up if symptoms do not improve within 7-10 days, or sooner for worsening.  U07.1    Relevant  Medications    nirmatrelvir-ritonavir (PAXLOVID) 300 mg (150 mg x 2)-100 mg tablet therapy pack

## 2024-12-11 ENCOUNTER — TELEPHONE (OUTPATIENT)
Dept: SLEEP MEDICINE | Facility: HOSPITAL | Age: 54
End: 2024-12-11
Payer: COMMERCIAL

## 2024-12-11 DIAGNOSIS — G47.33 OBSTRUCTIVE SLEEP APNEA: ICD-10-CM

## 2024-12-11 NOTE — TELEPHONE ENCOUNTER
Patient called to request a prescription for new PAP Supplies be sent to [facility] because previous prescription has . Patient would like to order supplies before end of year.    Patient scheduled for follow-up on 2025.  Last visit 10/17/2023.

## 2024-12-18 ENCOUNTER — LAB (OUTPATIENT)
Dept: LAB | Facility: LAB | Age: 54
End: 2024-12-18
Payer: COMMERCIAL

## 2024-12-18 DIAGNOSIS — Z12.5 SCREENING FOR PROSTATE CANCER: ICD-10-CM

## 2024-12-18 DIAGNOSIS — Z12.5 SCREENING FOR PROSTATE CANCER: Primary | ICD-10-CM

## 2024-12-18 DIAGNOSIS — Z11.59 NEED FOR HEPATITIS C SCREENING TEST: ICD-10-CM

## 2024-12-18 DIAGNOSIS — E78.2 MIXED HYPERLIPIDEMIA: ICD-10-CM

## 2024-12-18 DIAGNOSIS — Z11.4 ENCOUNTER FOR SCREENING FOR HIV: ICD-10-CM

## 2024-12-18 DIAGNOSIS — Z00.00 ROUTINE MEDICAL EXAM: ICD-10-CM

## 2024-12-18 LAB
ALBUMIN SERPL BCP-MCNC: 4.2 G/DL (ref 3.4–5)
ALP SERPL-CCNC: 49 U/L (ref 33–120)
ALT SERPL W P-5'-P-CCNC: 29 U/L (ref 10–52)
ANION GAP SERPL CALC-SCNC: 11 MMOL/L (ref 10–20)
AST SERPL W P-5'-P-CCNC: 22 U/L (ref 9–39)
BASOPHILS # BLD AUTO: 0.08 X10*3/UL (ref 0–0.1)
BASOPHILS NFR BLD AUTO: 2.4 %
BILIRUB SERPL-MCNC: 1.2 MG/DL (ref 0–1.2)
BUN SERPL-MCNC: 20 MG/DL (ref 6–23)
CALCIUM SERPL-MCNC: 9.1 MG/DL (ref 8.6–10.3)
CHLORIDE SERPL-SCNC: 102 MMOL/L (ref 98–107)
CHOLEST SERPL-MCNC: 241 MG/DL (ref 0–199)
CHOLESTEROL/HDL RATIO: 6.3
CO2 SERPL-SCNC: 28 MMOL/L (ref 21–32)
CREAT SERPL-MCNC: 1.09 MG/DL (ref 0.5–1.3)
EGFRCR SERPLBLD CKD-EPI 2021: 81 ML/MIN/1.73M*2
EOSINOPHIL # BLD AUTO: 0.26 X10*3/UL (ref 0–0.7)
EOSINOPHIL NFR BLD AUTO: 7.7 %
ERYTHROCYTE [DISTWIDTH] IN BLOOD BY AUTOMATED COUNT: 12 % (ref 11.5–14.5)
GLUCOSE SERPL-MCNC: 85 MG/DL (ref 74–99)
HCT VFR BLD AUTO: 44.7 % (ref 41–52)
HCV AB SER QL: NONREACTIVE
HDLC SERPL-MCNC: 38.5 MG/DL
HGB BLD-MCNC: 15 G/DL (ref 13.5–17.5)
HIV 1+2 AB+HIV1 P24 AG SERPL QL IA: NONREACTIVE
IMM GRANULOCYTES # BLD AUTO: 0.01 X10*3/UL (ref 0–0.7)
IMM GRANULOCYTES NFR BLD AUTO: 0.3 % (ref 0–0.9)
LDLC SERPL CALC-MCNC: 181 MG/DL
LYMPHOCYTES # BLD AUTO: 1.24 X10*3/UL (ref 1.2–4.8)
LYMPHOCYTES NFR BLD AUTO: 36.7 %
MCH RBC QN AUTO: 31.6 PG (ref 26–34)
MCHC RBC AUTO-ENTMCNC: 33.6 G/DL (ref 32–36)
MCV RBC AUTO: 94 FL (ref 80–100)
MONOCYTES # BLD AUTO: 0.36 X10*3/UL (ref 0.1–1)
MONOCYTES NFR BLD AUTO: 10.7 %
NEUTROPHILS # BLD AUTO: 1.43 X10*3/UL (ref 1.2–7.7)
NEUTROPHILS NFR BLD AUTO: 42.2 %
NON HDL CHOLESTEROL: 203 MG/DL (ref 0–149)
NRBC BLD-RTO: 0 /100 WBCS (ref 0–0)
PLATELET # BLD AUTO: 202 X10*3/UL (ref 150–450)
POTASSIUM SERPL-SCNC: 4.1 MMOL/L (ref 3.5–5.3)
PROT SERPL-MCNC: 7 G/DL (ref 6.4–8.2)
PSA SERPL-MCNC: 0.77 NG/ML
RBC # BLD AUTO: 4.75 X10*6/UL (ref 4.5–5.9)
SODIUM SERPL-SCNC: 137 MMOL/L (ref 136–145)
TRIGL SERPL-MCNC: 107 MG/DL (ref 0–149)
VLDL: 21 MG/DL (ref 0–40)
WBC # BLD AUTO: 3.4 X10*3/UL (ref 4.4–11.3)

## 2024-12-18 PROCEDURE — 85025 COMPLETE CBC W/AUTO DIFF WBC: CPT

## 2024-12-18 PROCEDURE — 87389 HIV-1 AG W/HIV-1&-2 AB AG IA: CPT

## 2024-12-18 PROCEDURE — 80053 COMPREHEN METABOLIC PANEL: CPT

## 2024-12-18 PROCEDURE — 86803 HEPATITIS C AB TEST: CPT

## 2024-12-18 PROCEDURE — G0103 PSA SCREENING: HCPCS

## 2024-12-18 PROCEDURE — 80061 LIPID PANEL: CPT

## 2024-12-18 PROCEDURE — 36415 COLL VENOUS BLD VENIPUNCTURE: CPT

## 2024-12-18 NOTE — TELEPHONE ENCOUNTER
Spoke with Dr. Shah. Order placed for CPAP supplies.     Message sent to [facility] sleep liaison to notify them of new PAP order.

## 2024-12-18 NOTE — TELEPHONE ENCOUNTER
Called patient and Left VM to let patient know that Dr. Shah placed CPAP supply Rx and sent to [facility]. Gave [facility] number [phone] to call and place order. Gave sleep nurse number [phone] to call if he has any questions..

## 2024-12-23 ENCOUNTER — APPOINTMENT (OUTPATIENT)
Dept: PRIMARY CARE | Facility: CLINIC | Age: 54
End: 2024-12-23
Payer: COMMERCIAL

## 2024-12-23 ENCOUNTER — TELEPHONE (OUTPATIENT)
Dept: PRIMARY CARE | Facility: CLINIC | Age: 54
End: 2024-12-23

## 2024-12-23 VITALS
HEIGHT: 75 IN | BODY MASS INDEX: 29.84 KG/M2 | OXYGEN SATURATION: 98 % | WEIGHT: 240 LBS | SYSTOLIC BLOOD PRESSURE: 134 MMHG | DIASTOLIC BLOOD PRESSURE: 70 MMHG | HEART RATE: 75 BPM

## 2024-12-23 DIAGNOSIS — J45.20 MILD INTERMITTENT ASTHMA WITHOUT COMPLICATION (HHS-HCC): ICD-10-CM

## 2024-12-23 DIAGNOSIS — G43.109 MIGRAINE WITH AURA AND WITHOUT STATUS MIGRAINOSUS, NOT INTRACTABLE: ICD-10-CM

## 2024-12-23 DIAGNOSIS — Z11.4 ENCOUNTER FOR SCREENING FOR HIV: ICD-10-CM

## 2024-12-23 DIAGNOSIS — Z00.00 ROUTINE MEDICAL EXAM: ICD-10-CM

## 2024-12-23 DIAGNOSIS — E78.2 MIXED HYPERLIPIDEMIA: ICD-10-CM

## 2024-12-23 DIAGNOSIS — Z12.5 SCREENING FOR PROSTATE CANCER: ICD-10-CM

## 2024-12-23 DIAGNOSIS — Z00.00 WELL ADULT EXAM: Primary | ICD-10-CM

## 2024-12-23 DIAGNOSIS — Z11.59 NEED FOR HEPATITIS C SCREENING TEST: ICD-10-CM

## 2024-12-23 DIAGNOSIS — G47.33 OBSTRUCTIVE SLEEP APNEA: ICD-10-CM

## 2024-12-23 PROBLEM — E78.5 HYPERLIPIDEMIA: Status: ACTIVE | Noted: 2022-01-11

## 2024-12-23 LAB
APPEARANCE UR: CLEAR
BILIRUB UR STRIP.AUTO-MCNC: NEGATIVE MG/DL
COLOR UR: NORMAL
GLUCOSE UR STRIP.AUTO-MCNC: NORMAL MG/DL
KETONES UR STRIP.AUTO-MCNC: NEGATIVE MG/DL
LEUKOCYTE ESTERASE UR QL STRIP.AUTO: NEGATIVE
NITRITE UR QL STRIP.AUTO: NEGATIVE
PH UR STRIP.AUTO: 5.5 [PH]
PROT UR STRIP.AUTO-MCNC: NEGATIVE MG/DL
RBC # UR STRIP.AUTO: NEGATIVE /UL
SP GR UR STRIP.AUTO: 1.02
UROBILINOGEN UR STRIP.AUTO-MCNC: NORMAL MG/DL

## 2024-12-23 PROCEDURE — 3008F BODY MASS INDEX DOCD: CPT | Performed by: FAMILY MEDICINE

## 2024-12-23 PROCEDURE — 99396 PREV VISIT EST AGE 40-64: CPT | Performed by: FAMILY MEDICINE

## 2024-12-23 PROCEDURE — 81003 URINALYSIS AUTO W/O SCOPE: CPT

## 2024-12-23 RX ORDER — IPRATROPIUM BROMIDE 21 UG/1
2 SPRAY, METERED NASAL EVERY 12 HOURS
COMMUNITY

## 2024-12-23 ASSESSMENT — ENCOUNTER SYMPTOMS
COUGH: 0
SHORTNESS OF BREATH: 0
FEVER: 0
DYSPHORIC MOOD: 0
NUMBNESS: 0
NAUSEA: 0
CHILLS: 0
MYALGIAS: 0
BLOOD IN STOOL: 0
ARTHRALGIAS: 0
ABDOMINAL PAIN: 0
SORE THROAT: 0
TROUBLE SWALLOWING: 0
HEMATURIA: 0
DIFFICULTY URINATING: 0
UNEXPECTED WEIGHT CHANGE: 0
WEAKNESS: 0
VOMITING: 0
NERVOUS/ANXIOUS: 0

## 2024-12-23 ASSESSMENT — PATIENT HEALTH QUESTIONNAIRE - PHQ9
1. LITTLE INTEREST OR PLEASURE IN DOING THINGS: NOT AT ALL
2. FEELING DOWN, DEPRESSED OR HOPELESS: NOT AT ALL
SUM OF ALL RESPONSES TO PHQ9 QUESTIONS 1 AND 2: 0

## 2024-12-23 ASSESSMENT — PAIN SCALES - GENERAL: PAINLEVEL_OUTOF10: 0-NO PAIN

## 2024-12-23 ASSESSMENT — COLUMBIA-SUICIDE SEVERITY RATING SCALE - C-SSRS: 1. IN THE PAST MONTH, HAVE YOU WISHED YOU WERE DEAD OR WISHED YOU COULD GO TO SLEEP AND NOT WAKE UP?: NO

## 2024-12-23 NOTE — ASSESSMENT & PLAN NOTE
Lab Results   Component Value Date    LDLCALC 181 (H) 12/18/2024    LDLCALC 163 (H) 06/22/2024    LDLCALC 183 (H) 12/15/2023   Uncontrolled. Add exercise back into regimen aiming for 4 days per week.  Recheck in May

## 2024-12-23 NOTE — PROGRESS NOTES
"Subjective   Patient ID: Holger Mckeon is a 54 y.o. male who presents for Annual Exam (Patient is in office today for a CPE. He states he doesn't need a chaperone for his exam. Last EKG 12/21/2023).    HPI  Patient Care Team:  Savana Garcia MD as PCP - General (Family Medicine)  Savana Garcia MD as PCP - [facility] PCP    Holger Mckeon is seen for comprehensive physical exam.  PMH, PSH, family history and social history were reviewed and updated.  Hypercholesterolemia - Takes Omega 3, CT calcium sort in 2019 was negative for any plaque, nuclear stress test done in 2011 was negative, decreased exercise in past 6 months due to injury but knows what he has done and has a plan to correct this,   myopic degeneration -  sees ophthalmologist  reactive airway disease -  well controlled without any flares over the past year  seasonal allergies -  stable, battling chronic sinus issues again with flare over the past couple weeks,   OZZY - sees Dr. Estrada  Migraines - Stabble    Review of Systems   Constitutional:  Negative for chills, fever and unexpected weight change.   HENT:  Negative for congestion, hearing loss, postnasal drip, sore throat and trouble swallowing.    Eyes:  Negative for visual disturbance.   Respiratory:  Negative for cough and shortness of breath.    Cardiovascular:  Negative for chest pain and leg swelling.   Gastrointestinal:  Negative for abdominal pain, blood in stool, nausea and vomiting.   Genitourinary:  Negative for difficulty urinating and hematuria.   Musculoskeletal:  Negative for arthralgias and myalgias.   Skin:  Negative for rash.   Neurological:  Negative for weakness and numbness.   Psychiatric/Behavioral:  Negative for dysphoric mood. The patient is not nervous/anxious.    All other systems reviewed and are negative.      Objective   /70   Pulse 75   Ht 1.905 m (6' 3\")   Wt 109 kg (240 lb)   SpO2 98%   BMI 30.00 kg/m²     Physical Exam  Vitals and nursing note reviewed. "   Constitutional:       Appearance: Normal appearance.   HENT:      Head: Normocephalic.      Right Ear: Tympanic membrane and ear canal normal.      Left Ear: Tympanic membrane and ear canal normal.      Nose: Nose normal.      Mouth/Throat:      Mouth: Mucous membranes are moist.   Eyes:      Extraocular Movements: Extraocular movements intact.      Pupils: Pupils are equal, round, and reactive to light.   Neck:      Vascular: No carotid bruit.   Cardiovascular:      Rate and Rhythm: Normal rate and regular rhythm.   Pulmonary:      Effort: Pulmonary effort is normal.      Breath sounds: Normal breath sounds.   Abdominal:      General: Abdomen is flat. Bowel sounds are normal.      Palpations: Abdomen is soft.      Tenderness: There is no abdominal tenderness.   Musculoskeletal:         General: Normal range of motion.      Cervical back: Normal range of motion.   Lymphadenopathy:      Cervical: No cervical adenopathy.   Skin:     General: Skin is warm.      Findings: No rash.   Neurological:      General: No focal deficit present.      Mental Status: He is alert.   Psychiatric:         Mood and Affect: Mood normal.         Assessment/Plan   Assessment & Plan  Well adult exam  Preventative measures discussed in detail.  Immunizations reviewed and discussed.  Reviewed labs with patient.         Mixed hyperlipidemia  Lab Results   Component Value Date    LDLCALC 181 (H) 12/18/2024    LDLCALC 163 (H) 06/22/2024    LDLCALC 183 (H) 12/15/2023   Uncontrolled. Add exercise back into regimen aiming for 4 days per week.  Recheck in May       Mild intermittent asthma without complication (HHS-HCC)  Continue with medication as needed.  Avoid triggers.       Obstructive sleep apnea  Continue working with specialist to get mask fitted adjusted       Migraine with aura and without status migrainosus, not intractable  Avoid triggers           Follow up 1 Year, sooner with any problems or concerns.

## 2025-01-14 ENCOUNTER — OFFICE VISIT (OUTPATIENT)
Dept: SLEEP MEDICINE | Facility: HOSPITAL | Age: 55
End: 2025-01-14
Payer: COMMERCIAL

## 2025-01-14 DIAGNOSIS — Z78.9 DIFFICULTY USING CONTINUOUS POSITIVE AIRWAY PRESSURE (CPAP) DEVICE: ICD-10-CM

## 2025-01-14 DIAGNOSIS — G47.33 OSA ON CPAP: Primary | ICD-10-CM

## 2025-01-14 PROCEDURE — 99214 OFFICE O/P EST MOD 30 MIN: CPT | Performed by: INTERNAL MEDICINE

## 2025-01-14 PROCEDURE — 99214 OFFICE O/P EST MOD 30 MIN: CPT | Mod: GC,95 | Performed by: INTERNAL MEDICINE

## 2025-01-14 NOTE — PROGRESS NOTES
Patient: Holger Mckeon    [id number]  : 1970 -- AGE 54 y.o.    Provider: Katina Heller MD     Location [facility]   Service Date: 2025              [facility]  Followup Visit Note    Virtual or Telephone Consent  An interactive audio and video telecommunication system which permits real time communications between the patient (at the originating site) and provider (at the distant site) was utilized to provide this telehealth service.   Verbal consent was requested and obtained from Holger Mckeon on this date, 25 for a telehealth visit      HISTORY OF PRESENT ILLNESS     HISTORY OF PRESENT ILLNESS   Holger Mckeon is a 54 y.o. male with h/o OZZY who presents to a [facility] for follow up.     Current History  OZZY  -On today's visit, the patient reports not being able to use PAP for > 4hrs at night as when he wakes up in middle of night for restroom pressure is too high and ramp is not working. Air leaks also is prompting him to remove it and he struggles to go back to sleep with CPAP on. He is using Rachelle View mask and got mask every 3 months changed.      Weekdays / Work Days Weekends / Days Off    Bedtime 11 pm 9 am   Sleep latency <5 minutes    Wake time 7:00 am    Naps? Yes - taking a nap around 8pm for 1-1.5 hours, 3x/week  Naps are not refreshing.        Nocturnal awakenings 1-2x/night around 3 am         PAP Info  D[employer]: [employer]    PAP Adherence  A PAP adherence download was obtained and data was reviewed personally today in clinic.  Download today shows Settings 10-12 cm H2O.  Total usage 100%, 90% usage for > 4hrs, avg usage 5hrs 57 mins, residual AHI 0.9, leaks 46.8 lpm,  Median and 95 centile pressure 10 and 10.6, max pressure 11    Daytime Symptoms  Patient report some daytime symptoms including: reports taking naps on days when he wakes up early.  Patient  denies daytime symptoms including excessive daytime sleepiness. Able to work well during day.      ALLERGIES AND MEDICATIONS     ALLERGIES  Allergies   Allergen Reactions    Amoxicillin GI Upset    Ibuprofen Unknown    Ibuprofen-Acetaminophen Hives       MEDICATIONS: He has a current medication list which includes the following prescription(s): albuterol - Inhale 2 puffs every 4 hours if needed, ascorbic acid - Take 2 tablets (1,000 mg) by mouth once daily, cetirizine - Take 1 tablet (10 mg) by mouth, cholecalciferol (vitamin d3) - daily, 0 Refill(s), Type: Maintenance, eletriptan - Take 1 tablet (40 mg) by mouth 1 time if needed, flaxseed oil, ipratropium - Administer 2 sprays into each nostril every 12 hours, lutein-zeaxanthin - Take by mouth, quercetin - Take 1 capsule by mouth, tizanidine - Take 1 tablet (4 mg) by mouth every 8 hours if needed for muscle spasms for up to 10 days, zafirlukast - Take 1 tablet (20 mg) by mouth, and zinc acetate - Take 140 mg by mouth once daily.    PAST MEDICAL HISTORY : He  has a past medical history of Allergic (Childhood), Asthma, Hyperlipidemia, unspecified, Migraine without aura, not intractable, without status migrainosus, Other intervertebral disc degeneration, lumbar region, and Visual impairment (25 years ago).    PAST SURGICAL HISTORY: He  has a past surgical history that includes Eye surgery.     FAMILY HISTORY: No changes since previous visit. Otherwise non-contributory as charted.     SOCIAL HISTORY  He  reports that he has never smoked. He has never used smokeless tobacco. He reports that he does not currently use alcohol. He reports that he does not use drugs.       PHYSICAL EXAM     VITAL SIGNS: There were no vitals taken for this visit. Due to nature of visit.    PREVIOUS WEIGHTS:  Wt Readings from Last 3 Encounters:   12/23/24 109 kg (240 lb)   06/26/24 109 kg (240 lb)   04/05/24 107 kg (235 lb)         RESULTS/DATA     Bicarbonate (mmol/L)   Date Value    12/18/2024 28   06/22/2024 27   12/15/2023 29     ASSESSMENT/PLAN      Holger Mckeon is a 54 y.o. male with PMHx of HLD, migraine, chronic sinusitis who presents for OZZY follow-up  -  currently on APAP 10-12cm H2O with Rachelle view mask. Struggling to use for > 4hrs due to Ramp not working when he wakes up middle of night for nocturia. Also reports leaks. Download shows evidence of leak and pt also reporting sensation of leak w/ need to wake up during night to adjust mask so will ask DME for mask refitting session to try different interfaces to help minimize leak and to get RAMP feature checked.  - RTC in 4 months for follow-up to monitor changes made today        Follow-Up: 4 months    Patient seen and discussed with Dr Shah      Attending Addendum:   F/U visit. Last seen on 10/17/2023. OZZY, on auto-CPAP 10-15 cm H2O. Tried [employer]ear FFM and loves it. Has a history of allergy to pollens, dusts, and cats. Has a dog at home. Chronic sinus congestion has improved on meds. Has 3 machines now: AS11 as the primary one, DS2 as backup, and Z2 for traveling. Interested in INSPIRE but not a great candidate (2016 AHI 16.4, did well on CPAP). Got Bongo for traveling which did not work. Had mucosal dryness and pressure intolerance in 2021. Advised to try Biotene gel and Ayr nasal gel. DL 10/2023 showed 97% use, 6.4 hours, AHI 0.9 and leaks 43.7 LPM on 10-15 cm H2O with an Rachelle View FFM. Advised mask refitting. Lowered pressure to 10-12 cm H2O. CPAP download today showed 100% compliance, 6 hours use. AHI 0.9, and leaks 46.8 LPM on CPAP of 10-12 cm H2O. Median/mean pressure 10.0 cm H2O and max pressure 11.0 cm H2O.  C/O Ramp not working middle of the night. Has high leaks. Advised mask refit and [facility] check on his machine. RTC 4 months. He verbalized understanding.      This is a telehealth encounter via Epic. An interactive audio and video telecommunication system which permits real time communications between the patient (at  "the originating site) and provider (at a distant site) was utilized to provide this telehealth service. Attestation: This telehealth visit was conducted in concordance with CDC's recommendation that during the SARS CoV2 outbreak healthcare facilities should \"provide non-urgent patient care by telephone\" especially in patients at increased risk for an adverse outcome when exposed to coronavirus infections <https://www.cdc.gov/coronavirus/2019-ncov/healthcare-facilities/steps-to-prepare.html>.       Phillpi Shah MD, FCCP, [facility]  Staff Physician  Division of Pulmonary, Critical Care, and Sleep Medicine  [facility]  Clinical Associate Professor of Medicine  [facility]      "

## 2025-05-15 LAB
CHOLEST SERPL-MCNC: 232 MG/DL
CHOLEST/HDLC SERPL: 5.7 (CALC)
HDLC SERPL-MCNC: 41 MG/DL
LDLC SERPL CALC-MCNC: 172 MG/DL (CALC)
NONHDLC SERPL-MCNC: 191 MG/DL (CALC)
TRIGL SERPL-MCNC: 82 MG/DL

## 2025-05-19 ENCOUNTER — APPOINTMENT (OUTPATIENT)
Dept: PRIMARY CARE | Facility: CLINIC | Age: 55
End: 2025-05-19
Payer: COMMERCIAL

## 2025-05-19 VITALS
WEIGHT: 232 LBS | SYSTOLIC BLOOD PRESSURE: 114 MMHG | BODY MASS INDEX: 29 KG/M2 | HEART RATE: 71 BPM | OXYGEN SATURATION: 95 % | DIASTOLIC BLOOD PRESSURE: 72 MMHG

## 2025-05-19 DIAGNOSIS — E78.2 MIXED HYPERLIPIDEMIA: Primary | ICD-10-CM

## 2025-05-19 DIAGNOSIS — J45.20 MILD INTERMITTENT ASTHMA, UNCOMPLICATED (HHS-HCC): ICD-10-CM

## 2025-05-19 PROCEDURE — 99213 OFFICE O/P EST LOW 20 MIN: CPT | Performed by: FAMILY MEDICINE

## 2025-05-19 RX ORDER — GLUCOSAM/CHONDRO/HERB 149/HYAL 750-100 MG
TABLET ORAL
COMMUNITY

## 2025-05-19 ASSESSMENT — PAIN SCALES - GENERAL: PAINLEVEL_OUTOF10: 0-NO PAIN

## 2025-05-19 NOTE — PROGRESS NOTES
Subjective     Patient ID: Holger Mckeon is a 55 y.o. male who presents for Hyperlipidemia.    HPI  Holger Mckeon is seen for his chronic issues.      Review of Systems    Objective  Vitals:  /72   Pulse 71   Wt 105 kg (232 lb)   SpO2 95%   BMI 29.00 kg/m²     Physical Exam    Assessment/Plan   Assessment & Plan      No orders of the defined types were placed in this encounter.    Follow up {Follow Up:61586}, sooner with any problems or concerns.

## 2025-05-27 NOTE — ASSESSMENT & PLAN NOTE
Lab Results   Component Value Date    LDLCALC 172 (H) 05/15/2025    LDLCALC 181 (H) 12/18/2024    LDLCALC 163 (H) 06/22/2024   Slight improvement.  HDL up to 41.  Continue to reduce fatty food intake and red meat.  Exercise at least 4 times per week.  Recheck in 6 months.

## 2025-07-02 ENCOUNTER — TELEPHONE (OUTPATIENT)
Dept: SLEEP MEDICINE | Facility: HOSPITAL | Age: 55
End: 2025-07-02
Payer: COMMERCIAL

## 2025-07-02 ENCOUNTER — TELEPHONE (OUTPATIENT)
Dept: PRIMARY CARE | Facility: CLINIC | Age: 55
End: 2025-07-02
Payer: COMMERCIAL

## 2025-07-02 DIAGNOSIS — Z13.6 ENCOUNTER FOR ABDOMINAL AORTIC ANEURYSM (AAA) SCREENING: ICD-10-CM

## 2025-07-02 NOTE — TELEPHONE ENCOUNTER
Pt called  [phone] every 5 years he does  Aortic  US aneurysm, he could not find the date of the last one, would [facility] know and if he needs one he would like an order

## 2025-07-02 NOTE — TELEPHONE ENCOUNTER
Pt called to schedule follow up appointment with Dr Shah by end of year, no template currently available.     Will call pt back to schedule

## 2025-08-07 ENCOUNTER — HOSPITAL ENCOUNTER (OUTPATIENT)
Dept: VASCULAR MEDICINE | Facility: CLINIC | Age: 55
Discharge: HOME | End: 2025-08-07
Payer: COMMERCIAL

## 2025-08-07 DIAGNOSIS — Z13.6 ENCOUNTER FOR ABDOMINAL AORTIC ANEURYSM (AAA) SCREENING: ICD-10-CM

## 2025-08-07 PROCEDURE — 76706 US ABDL AORTA SCREEN AAA: CPT

## 2025-08-26 ENCOUNTER — OFFICE VISIT (OUTPATIENT)
Dept: SLEEP MEDICINE | Facility: HOSPITAL | Age: 55
End: 2025-08-26
Payer: COMMERCIAL

## 2025-08-26 VITALS
DIASTOLIC BLOOD PRESSURE: 74 MMHG | TEMPERATURE: 97.3 F | HEART RATE: 80 BPM | BODY MASS INDEX: 29.62 KG/M2 | WEIGHT: 237 LBS | OXYGEN SATURATION: 97 % | SYSTOLIC BLOOD PRESSURE: 117 MMHG

## 2025-08-26 DIAGNOSIS — G47.33 OSA (OBSTRUCTIVE SLEEP APNEA): Primary | ICD-10-CM

## 2025-08-26 PROCEDURE — 1036F TOBACCO NON-USER: CPT | Performed by: INTERNAL MEDICINE

## 2025-08-26 PROCEDURE — 99214 OFFICE O/P EST MOD 30 MIN: CPT | Mod: GC | Performed by: INTERNAL MEDICINE

## 2025-08-26 PROCEDURE — 99214 OFFICE O/P EST MOD 30 MIN: CPT | Performed by: INTERNAL MEDICINE

## 2025-08-26 ASSESSMENT — PAIN SCALES - GENERAL: PAINLEVEL_OUTOF10: 0-NO PAIN

## 2025-12-18 ENCOUNTER — APPOINTMENT (OUTPATIENT)
Dept: PRIMARY CARE | Facility: CLINIC | Age: 55
End: 2025-12-18
Payer: COMMERCIAL

## 2025-12-22 ENCOUNTER — APPOINTMENT (OUTPATIENT)
Dept: PRIMARY CARE | Facility: CLINIC | Age: 55
End: 2025-12-22
Payer: COMMERCIAL

## 2025-12-23 ENCOUNTER — APPOINTMENT (OUTPATIENT)
Dept: PRIMARY CARE | Facility: CLINIC | Age: 55
End: 2025-12-23
Payer: COMMERCIAL